# Patient Record
Sex: MALE | Race: WHITE | URBAN - METROPOLITAN AREA
[De-identification: names, ages, dates, MRNs, and addresses within clinical notes are randomized per-mention and may not be internally consistent; named-entity substitution may affect disease eponyms.]

---

## 2024-05-28 ENCOUNTER — OFFICE (OUTPATIENT)
Dept: URBAN - METROPOLITAN AREA CLINIC 15 | Facility: CLINIC | Age: 74
End: 2024-05-28
Payer: COMMERCIAL

## 2024-05-28 VITALS
WEIGHT: 191 LBS | SYSTOLIC BLOOD PRESSURE: 128 MMHG | OXYGEN SATURATION: 95 % | DIASTOLIC BLOOD PRESSURE: 82 MMHG | HEIGHT: 68 IN | HEART RATE: 79 BPM | TEMPERATURE: 98.2 F

## 2024-05-28 DIAGNOSIS — K59.00 CONSTIPATION, UNSPECIFIED: ICD-10-CM

## 2024-05-28 DIAGNOSIS — Z86.010 PERSONAL HISTORY OF COLONIC POLYPS: ICD-10-CM

## 2024-05-28 PROCEDURE — 99204 OFFICE O/P NEW MOD 45 MIN: CPT | Performed by: PHYSICIAN ASSISTANT

## 2024-05-28 NOTE — SERVICEHPINOTES
Pt states that there has been a change in bowel pattern this year. Prior to the change, noted norm of 2-3 days with good evacuation without stain. Type 4 solid formed. Now can be as few as one every 6-7 days with straining and incomplete evacuation. This all came post Knee surgery and severe Allergic reaction from Vancomycin. Has tried Stool softener and OTC Fiber with marginal response. Now better but every 3-4 day with smaller stools. Evacuation better but not totally improvement. No bleeding and no abdominal pain.  Last Colonoscopy 2009. Polypectomy. Last [place] NEG last year.